# Patient Record
Sex: FEMALE | Race: OTHER | URBAN - METROPOLITAN AREA
[De-identification: names, ages, dates, MRNs, and addresses within clinical notes are randomized per-mention and may not be internally consistent; named-entity substitution may affect disease eponyms.]

---

## 2018-03-28 ENCOUNTER — EMERGENCY (EMERGENCY)
Facility: HOSPITAL | Age: 48
LOS: 1 days | Discharge: HOME | End: 2018-03-28
Admitting: EMERGENCY MEDICINE
Payer: COMMERCIAL

## 2018-03-28 VITALS
HEART RATE: 63 BPM | OXYGEN SATURATION: 100 % | RESPIRATION RATE: 18 BRPM | SYSTOLIC BLOOD PRESSURE: 138 MMHG | DIASTOLIC BLOOD PRESSURE: 85 MMHG | TEMPERATURE: 99 F

## 2018-03-28 VITALS
RESPIRATION RATE: 17 BRPM | OXYGEN SATURATION: 98 % | SYSTOLIC BLOOD PRESSURE: 195 MMHG | DIASTOLIC BLOOD PRESSURE: 98 MMHG | HEART RATE: 89 BPM | TEMPERATURE: 99 F

## 2018-03-28 DIAGNOSIS — S89.91XA UNSPECIFIED INJURY OF RIGHT LOWER LEG, INITIAL ENCOUNTER: ICD-10-CM
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
## 2018-03-28 DIAGNOSIS — Z79.82 LONG TERM (CURRENT) USE OF ASPIRIN: ICD-10-CM

## 2018-03-28 DIAGNOSIS — Z79.899 OTHER LONG TERM (CURRENT) DRUG THERAPY: ICD-10-CM

## 2018-03-28 DIAGNOSIS — S80.12XA CONTUSION OF LEFT LOWER LEG, INITIAL ENCOUNTER: ICD-10-CM

## 2018-03-28 DIAGNOSIS — S80.02XA CONTUSION OF LEFT KNEE, INITIAL ENCOUNTER: ICD-10-CM

## 2018-03-28 DIAGNOSIS — I10 ESSENTIAL (PRIMARY) HYPERTENSION: ICD-10-CM

## 2018-03-28 DIAGNOSIS — S80.211A ABRASION, RIGHT KNEE, INITIAL ENCOUNTER: ICD-10-CM
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
## 2018-03-28 DIAGNOSIS — S80.11XA CONTUSION OF RIGHT LOWER LEG, INITIAL ENCOUNTER: ICD-10-CM

## 2018-03-28 DIAGNOSIS — I25.10 ATHEROSCLEROTIC HEART DISEASE OF NATIVE CORONARY ARTERY WITHOUT ANGINA PECTORIS: ICD-10-CM

## 2018-03-28 DIAGNOSIS — E11.9 TYPE 2 DIABETES MELLITUS WITHOUT COMPLICATIONS: ICD-10-CM
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
## 2018-03-28 LAB
ALBUMIN SERPL ELPH-MCNC: 3.5 G/DL — SIGNIFICANT CHANGE UP (ref 3.4–5)
ALP SERPL-CCNC: 94 U/L — SIGNIFICANT CHANGE UP (ref 40–120)
ALT FLD-CCNC: 22 U/L — SIGNIFICANT CHANGE UP (ref 12–42)
ANION GAP SERPL CALC-SCNC: 6 MMOL/L — LOW (ref 9–16)
APTT BLD: 32.7 SEC — SIGNIFICANT CHANGE UP (ref 27.5–36.5)
AST SERPL-CCNC: 15 U/L — SIGNIFICANT CHANGE UP (ref 15–37)
BASOPHILS NFR BLD AUTO: 0.5 % — SIGNIFICANT CHANGE UP (ref 0–2)
BILIRUB SERPL-MCNC: 0.4 MG/DL — SIGNIFICANT CHANGE UP (ref 0.2–1.2)
BUN SERPL-MCNC: 13 MG/DL — SIGNIFICANT CHANGE UP (ref 7–23)
CALCIUM SERPL-MCNC: 9 MG/DL — SIGNIFICANT CHANGE UP (ref 8.5–10.5)
CHLORIDE SERPL-SCNC: 105 MMOL/L — SIGNIFICANT CHANGE UP (ref 96–108)
CO2 SERPL-SCNC: 29 MMOL/L — SIGNIFICANT CHANGE UP (ref 22–31)
CREAT SERPL-MCNC: 0.75 MG/DL — SIGNIFICANT CHANGE UP (ref 0.5–1.3)
EOSINOPHIL NFR BLD AUTO: 1.8 % — SIGNIFICANT CHANGE UP (ref 0–6)
GLUCOSE SERPL-MCNC: 134 MG/DL — HIGH (ref 70–99)
HCT VFR BLD CALC: 35.7 % — SIGNIFICANT CHANGE UP (ref 34.5–45)
HGB BLD-MCNC: 11.1 G/DL — LOW (ref 11.5–15.5)
IMM GRANULOCYTES NFR BLD AUTO: 0.3 % — SIGNIFICANT CHANGE UP (ref 0–1.5)
INR BLD: 1.04 — SIGNIFICANT CHANGE UP (ref 0.88–1.16)
LYMPHOCYTES # BLD AUTO: 16.6 % — SIGNIFICANT CHANGE UP (ref 13–44)
MCHC RBC-ENTMCNC: 22.6 PG — LOW (ref 27–34)
MCHC RBC-ENTMCNC: 31.1 G/DL — LOW (ref 32–36)
MCV RBC AUTO: 72.7 FL — LOW (ref 80–100)
MONOCYTES NFR BLD AUTO: 5.4 % — SIGNIFICANT CHANGE UP (ref 2–14)
NEUTROPHILS NFR BLD AUTO: 75.4 % — SIGNIFICANT CHANGE UP (ref 43–77)
PLATELET # BLD AUTO: 335 K/UL — SIGNIFICANT CHANGE UP (ref 150–400)
POTASSIUM SERPL-MCNC: 3.9 MMOL/L — SIGNIFICANT CHANGE UP (ref 3.5–5.3)
POTASSIUM SERPL-SCNC: 3.9 MMOL/L — SIGNIFICANT CHANGE UP (ref 3.5–5.3)
PROT SERPL-MCNC: 7.3 G/DL — SIGNIFICANT CHANGE UP (ref 6.4–8.2)
PROTHROM AB SERPL-ACNC: 11.4 SEC — SIGNIFICANT CHANGE UP (ref 9.8–12.7)
RBC # BLD: 4.91 M/UL — SIGNIFICANT CHANGE UP (ref 3.8–5.2)
RBC # FLD: 14.6 % — SIGNIFICANT CHANGE UP (ref 10.3–16.9)
SODIUM SERPL-SCNC: 140 MMOL/L — SIGNIFICANT CHANGE UP (ref 132–145)
TROPONIN I SERPL-MCNC: <0.017 NG/ML — LOW (ref 0.02–0.06)
WBC # BLD: 9.8 K/UL — SIGNIFICANT CHANGE UP (ref 3.8–10.5)
WBC # FLD AUTO: 9.8 K/UL — SIGNIFICANT CHANGE UP (ref 3.8–10.5)

## 2018-03-28 PROCEDURE — 99285 EMERGENCY DEPT VISIT HI MDM: CPT | Mod: 25

## 2018-03-28 PROCEDURE — 93010 ELECTROCARDIOGRAM REPORT: CPT

## 2018-03-28 PROCEDURE — 73630 X-RAY EXAM OF FOOT: CPT | Mod: 26,RT

## 2018-03-28 PROCEDURE — 73590 X-RAY EXAM OF LOWER LEG: CPT | Mod: 26,50

## 2018-03-28 PROCEDURE — 73562 X-RAY EXAM OF KNEE 3: CPT | Mod: 26,50

## 2018-03-28 PROCEDURE — 70450 CT HEAD/BRAIN W/O DYE: CPT | Mod: 26

## 2018-03-28 PROCEDURE — 71046 X-RAY EXAM CHEST 2 VIEWS: CPT | Mod: 26

## 2018-03-28 RX ORDER — ACETAMINOPHEN 500 MG
650 TABLET ORAL ONCE
Qty: 0 | Refills: 0 | Status: COMPLETED | OUTPATIENT
Start: 2018-03-28 | End: 2018-03-28

## 2018-03-28 RX ADMIN — Medication 650 MILLIGRAM(S): at 12:12

## 2018-03-28 NOTE — ED PROVIDER NOTE - NEUROLOGICAL, MLM
Alert and oriented, no focal deficits, no motor or sensory deficits. Alert and oriented, no focal deficits, no motor or sensory deficits. sensation intact below hematomas.

## 2018-03-28 NOTE — ED PROVIDER NOTE - MUSCULOSKELETAL MINIMAL EXAM
+5x8cm hematoma to left proximal shin, 4x7cm hematoma to right proximal shin, 2x3cm abrasion to right knee, old abrasion to left knee, mild to moderate swelling and ecchymosis to left knee, decreased flexion of left knee to 75 degrees +5x8cm hematoma to left proximal shin, 4x7cm hematoma to right proximal shin, 2x3cm abrasion to right knee, old abrasion to left knee, mild to moderate swelling and ecchymosis to left knee, decreased flexion of left knee to 75 degrees, compartments soft, pulses intact

## 2018-03-28 NOTE — ED PROVIDER NOTE - PRIOR EKG STATUS
there is no prior EKG available for comparison confirmed with pt's cardiologist Dr. Valenzuela ([facility])/the EKG is unchanged from prior EKG

## 2018-03-28 NOTE — ED PROVIDER NOTE - PLAN OF CARE
will obtain XRs bilat knees and tib/fib and CTH. ice applied. tylenol given. will reassess. will obtain XRs bilat knees and tib/fib and CTH. ice applied. tylenol given. will check coags and trop, EKG, and CXR. will reassess.

## 2018-03-28 NOTE — ED PROVIDER NOTE - PROGRESS NOTE DETAILS
tylenol given, ice packs applied re-evaluated every hour to assess for increased swelling or bruising. swelling and bruising have been stable, no evidence of rapidly expanding hematoma or compartment syndrome. pt discussed with her cardiologist who recommended holding her AC for a few days.

## 2018-03-28 NOTE — ED PROVIDER NOTE - OBJECTIVE STATEMENT
49yo F h/o CAD s/p stent placement on brilinta and aspirin (followed by Dr. Rolle [facility] cards), HTN, DM presents with pain and swelling to bilat knees and shins after fall down stairs today. pt states she tripped on a bag she was carrying and fell down the stairs. denies HS or LOC. denies prodrome to events including CP, palpitations, SOB, nausea, vomiting, HA, vision changes. After the fall she denies any symptoms other than her BLE pain, without nausea, vomiting, palpitations, SOB, abd pain, HA, neck pain, back pain, numbness, tingling, weakness, vision changes. Pt states this is her third trip and fall in 10 days, she was not seen for previous falls, but presents today because of the swelling and ecchymosis, concern for bleeding subcutaneously after the fall. she also notes it's difficult to bend her left knee, not 2/2 pain. She does note some CP which she believes is caused by her fall earlier this week.

## 2018-03-28 NOTE — ED PROVIDER NOTE - MEDICAL DECISION MAKING DETAILS
XRs negative for fracture or dislocation, effusion present on L knee XR. CTH negative. serial exams show no evidence of rapidly expanding hematoma, swelling and ecchymosis stable. instructed regarding RICE. pt will hold AC per instructions of her cardiologist, Dr. Aquilino SOTO. XRs negative for fracture or dislocation, effusion present on L knee XR. CTH negative. serial exams show no evidence of rapidly expanding hematoma, swelling and ecchymosis stable. instructed regarding RICE. pt will hold AC per instructions of her cardiologist, Dr. Aquilino SOTO. Trop negative, one done as pain has been constant since fall 3 days ago.

## 2018-03-28 NOTE — ED PROVIDER NOTE - PMH
Coronary artery disease involving native heart with angina pectoris, unspecified vessel or lesion type    Essential hypertension    Type 2 diabetes mellitus without complication, with long-term current use of insulin

## 2018-03-28 NOTE — ED PROVIDER NOTE - CARE PLAN
Assessment and plan of treatment:	will obtain XRs bilat knees and tib/fib and CTH. ice applied. tylenol given. will reassess. Assessment and plan of treatment:	will obtain XRs bilat knees and tib/fib and CTH. ice applied. tylenol given. will check coags and trop, EKG, and CXR. will reassess. Principal Discharge DX:	Hematoma  Assessment and plan of treatment:	will obtain XRs bilat knees and tib/fib and CTH. ice applied. tylenol given. will check coags and trop, EKG, and CXR. will reassess.

## 2021-01-26 NOTE — ED ADULT NURSE NOTE - PAIN: PRESENCE, MLM
Kernig and Brudzinski signs area negative, no petechiae, no photophobia, no dysarthria, no facial asymmetry, no focal deficits.
complains of pain/discomfort

## 2022-06-23 NOTE — ED PROVIDER NOTE - EXTREMITY EXAM
[Normal] : normal S1, S2, no murmur, no rub, no gallop mild tenderness to right proximal dorsum of foot

## 2024-02-21 ENCOUNTER — EMERGENCY (EMERGENCY)
Facility: HOSPITAL | Age: 54
LOS: 1 days | Discharge: HOME | End: 2024-02-21
Attending: EMERGENCY MEDICINE | Admitting: EMERGENCY MEDICINE
Payer: COMMERCIAL

## 2024-02-21 VITALS
RESPIRATION RATE: 15 BRPM | HEART RATE: 72 BPM | TEMPERATURE: 99 F | OXYGEN SATURATION: 97 % | DIASTOLIC BLOOD PRESSURE: 84 MMHG | HEIGHT: 60 IN | WEIGHT: 190.04 LBS | SYSTOLIC BLOOD PRESSURE: 138 MMHG

## 2024-02-21 DIAGNOSIS — I10 ESSENTIAL (PRIMARY) HYPERTENSION: ICD-10-CM
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
## 2024-02-21 DIAGNOSIS — S00.83XA CONTUSION OF OTHER PART OF HEAD, INITIAL ENCOUNTER: ICD-10-CM

## 2024-02-21 DIAGNOSIS — Z04.3 ENCOUNTER FOR EXAMINATION AND OBSERVATION FOLLOWING OTHER ACCIDENT: ICD-10-CM
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
## 2024-02-21 DIAGNOSIS — E11.9 TYPE 2 DIABETES MELLITUS WITHOUT COMPLICATIONS: ICD-10-CM
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
## 2024-02-21 PROBLEM — I25.119 ATHEROSCLEROTIC HEART DISEASE OF NATIVE CORONARY ARTERY WITH UNSPECIFIED ANGINA PECTORIS: Chronic | Status: ACTIVE | Noted: 2018-03-28

## 2024-02-21 PROCEDURE — 99284 EMERGENCY DEPT VISIT MOD MDM: CPT

## 2024-02-21 PROCEDURE — 70450 CT HEAD/BRAIN W/O DYE: CPT | Mod: 26

## 2024-02-21 NOTE — ED ADULT NURSE NOTE - NSFALLUNIVINTERV_ED_ALL_ED
Bed/Stretcher in lowest position, wheels locked, appropriate side rails in place/Call bell, personal items and telephone in reach/Instruct patient to call for assistance before getting out of bed/chair/stretcher/Non-slip footwear applied when patient is off stretcher/[place] to call system/Physically safe environment - no spills, clutter or unnecessary equipment/Purposeful proactive rounding/Room/bathroom lighting operational, light cord in reach

## 2024-02-21 NOTE — ED PROVIDER NOTE - CARE PROVIDER_API CALL
Harvinder Corado [facility]  [address], [facility] [place] [address]  Phone: [phone]  Fax: [phone]  Follow Up Time: 4-6 Days

## 2024-02-21 NOTE — ED ADULT NURSE NOTE - NSICDXPASTMEDICALHX_GEN_ALL_CORE_FT
PAST MEDICAL HISTORY:  Coronary artery disease involving native heart with angina pectoris, unspecified vessel or lesion type     Essential hypertension     Type 2 diabetes mellitus without complication, with long-term current use of insulin

## 2024-02-21 NOTE — ED PROVIDER NOTE - CLINICAL SUMMARY MEDICAL DECISION MAKING FREE TEXT BOX
Minor head injury, CT head negative, supportive care, head injury discharge instructions, stable for DC home.

## 2024-02-21 NOTE — ED PROVIDER NOTE - OBJECTIVE STATEMENT
53-year-old female with history of diabetes, hypertension, status post [place] she [place] and hit the front part of her head on the floor, no LOC, she felt dazed afterward and had a mild headache which is now resolved.  No blurry vision.  Recently had right eye surgery, no other complaints.  No nausea/vomiting, no ringing in the ears, no LOC.

## 2024-02-21 NOTE — ED PROVIDER NOTE - PATIENT PORTAL LINK FT
You can access the FollowMyHealth Patient Portal offered by [facility] by registering at the following website: [email or web address]. By joining [employer]’s FollowMyHealth portal, you will also be able to view your health information using other applications (apps) compatible with our system.

## 2024-02-21 NOTE — ED PROVIDER NOTE - PHYSICAL EXAMINATION
VSS in NAD non toxic appearing   (+) 2 cm hematoma mid forehead EOMI PERRL OP clear  normal neuro exam CN I-XII grossly intact, no groos motor or sensory deficits  no peripheral c/c/e VSS in NAD non toxic appearing   (+) 2 cm hematoma mid forehead EOMI PERRL OP clear  normal neuro exam CN I-XII grossly intact, no groos motor or sensory deficits  no peripheral c/c/e  CONSTITUTIONAL: Well-appearing; well-nourished; in no apparent distress.   HEAD: Normocephalic; atraumatic.   EYES:  clear bilaterally  ENT: airway patent  Resp breathing comfortably with no distress  PSYCHOLOGICAL: The patient’s mood and manner are appropriate.

## 2024-07-11 NOTE — ED ADULT NURSE NOTE - CAS ELECT INFOMATION PROVIDED
[facility]  SPEECH THERAPY  [facility] NEUROSCIENCES 4A  Speech - Language - Cognitive Evaluation + Clinical Swallow Evaluation + Cognitive Treatment    SLP Individual Minutes  Time In: 1024  Time Out: 1053  Minutes: 29  Timed Code Treatment Minutes: 8 Minutes  Speech, Language, Cognitive Evaluation: 13 minutes  Clinical Swallow Evaluation: 8 minutes  Cognitive Treatment: 8 minutes    DIET ORDER RECOMMENDATIONS AFTER EVALUATION: Regular Diet with Thin Liquids    Date: 2024  Patient Name: Rahel Lopez      CSN: [id number]   : 3/4/1932  (89 y.o.)  Gender: female   Referring Physician:  Berkley Frazier PA-C   Diagnosis: Dysarthria   Precautions: Fall Risk  History of Present Illness/Injury: Patient admitted to [facility] with above diagnosis; please see physician H&P for full report. Per chart review, \"Patient presents to the ED with a one day history of falls and problems with her speech.  The patient reports she fell twice in the house today.  The first time she was able to pull herself back up and didn't report the incident to anyone.  A little later in the day she was talking to her sister on the phone and after they hung up her sister contacted another family member to check on the patient due to confusion and slurred speech.  When the family member arrived the patient had fallen and could not get off the floor.  Patient does endorse some left sided weakness earlier in the day but that has resolved.  While in the ED the slurred speech also resolves.  Patient will be admitted for MRI of her brain and further optimization of her medical problems.\"    ST consulted to further evaluate oropharyngeal swallow integrity and cognitive function with implementation of goals/POC as clinically indicated.     Past Medical History:   Diagnosis Date    Arthritis     Cataract of both eyes     GERD (gastroesophageal reflux disease)     Hyperlipidemia     Hypertension     Keratosis     benign lichemoid     Psychiatric problem        Pain: No pain reported.    Subjective:  Patient seen with RN Pallavi permission. Patient seen sitting upright in recliner consuming breakfast meal upon ST arrival; RN with report patient with plans to transfer to  following breakfast, however, agreeable for completion of evaluation. Patient alert and cooperative throughout evaluation. Patient's son, Peter, present and actively involved in ST session.    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   IADLs: Independent  Hobbies: Cross Stitching, Playing Computer Games  Vision Status: Impaired; Wears Corrective Lenses  Hearing: Impaired; Bilateral Hearing Aids  Type of Home: House  Home Layout: One level  Home Access: Stairs to enter with rails  Entrance Stairs - Number of Steps: 2  Entrance Stairs - Rails: Left  Home Equipment: Rollator, Walker - Rolling, Cane    SPEECH / VOICE:  Speech and Voice appear to be grossly intact for basic and complex daily communication    LANGUAGE:  Receptive:  1 Step Commands: 2/2 independent  2 Step Commands: 2/2 independent  Simple Yes/No Questions: 2/2 independent  Complex Yes/No Questions: 2/2 independent  Receptive language skills appear to be grossly intact for basic and complex daily communication.    Expressive:  Automatic Speech: 2/2 independent  Sentence Completion (automatic): 2/2 independent  Sentence Completion (open-ended): 2/2 independent  Confrontational Namin/3 independent  Divergent Naming (abstract): 11 members/60 seconds  Sentence Formulation: WFL  Conversational Speech: WFL  Expressive language skills appear to be grossly  DC instructions